# Patient Record
Sex: MALE | Race: WHITE
[De-identification: names, ages, dates, MRNs, and addresses within clinical notes are randomized per-mention and may not be internally consistent; named-entity substitution may affect disease eponyms.]

---

## 2018-09-13 ENCOUNTER — HOSPITAL ENCOUNTER (EMERGENCY)
Dept: HOSPITAL 62 - ER | Age: 43
Discharge: HOME | End: 2018-09-13
Payer: COMMERCIAL

## 2018-09-13 DIAGNOSIS — L23.7: Primary | ICD-10-CM

## 2018-09-13 PROCEDURE — 96372 THER/PROPH/DIAG INJ SC/IM: CPT

## 2018-09-13 PROCEDURE — 99282 EMERGENCY DEPT VISIT SF MDM: CPT

## 2018-09-13 NOTE — ER DOCUMENT REPORT
ED Skin Rash/Insect Bite/Abscs





- General


Chief Complaint: Skin Problem


Stated Complaint: POSSIBLE ALLERGIC REACTION


Time Seen by Provider: 09/13/18 07:57


Notes: 





42-year-old male to the emergency department chief complaint of rash and 

itching.  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  Knows that he was exposed 

to it because he was cutting a downed tree.  Has rash on his left arm, neck, 

face, back.  Very uncomfortable. Cant Stop itching.


TRAVEL OUTSIDE OF THE [place] IN LAST 30 DAYS: No





- HPI


Patient complains to provider of: Skin rash/lesion


Onset: Yesterday


Onset/Duration: Gradual, Worse





- Related Data


Allergies/Adverse Reactions: 


 





No Known Allergies Allergy (Unverified 09/13/18 07:50)


 











Past Medical History





- General


Information source: Patient





- Social History


Smoking Status: Never Smoker


Frequency of alcohol use: Occasional


Drug Abuse: None


Lives with: Family


Family History: Reviewed & Not Pertinent





- Medical History


Medical History: Negative





Review of Systems





- Review of Systems


Constitutional: denies: Fever, Malaise, Weakness


EENT: denies: Double vision, Difficulty swallowing, Throat swelling, Mouth pain


Cardiovascular: denies: Chest pain, Palpitations, Heart racing


Respiratory: denies: Cough, Hurts to breathe, Short of breath, Wheezing


Musculoskeletal: denies: Back pain, Joint pain


Skin: See HPI, Lesions, Rash





Physical Exam





- Vital signs


Interpretation: Normal





- General


General appearance: Appears well, Alert





- HEENT


Head: Normocephalic, Atraumatic


Eyes: Normal


Pupils: PERRL





- Respiratory


Respiratory status: No respiratory distress


Chest status: Nontender


Breath sounds: Normal


Chest palpation: Normal





- Cardiovascular


Rhythm: Regular


Heart sounds: Normal auscultation


Murmur: No





- Abdominal


Inspection: Normal


Distension: No distension


Bowel sounds: Normal


Tenderness: Nontender


Organomegaly: No organomegaly





- Extremities


General upper extremity: Nontender, Normal color, Normal ROM, Normal temperature

, Other - Left upper extremity demonstrates rash consistent with a contact 

dermatitis


General lower extremity: Normal inspection, Nontender, Normal color, Normal ROM

, Normal temperature, Normal weight bearing.  No: Ousmane's sign





- Skin


Skin Temperature: Warm


Skin Moisture: Dry


Skin Color: Other - There is a large amount of distribution of what appears to 

be a contact dermatitis on the left upper extremity with vesicles and blisters 

with surrounding erythema.  Rash extends up to the left arm, axilla, left side 

of the neck, face, back.





Course





- Re-evaluation


Re-evalutation: 





09/13/18 08:26


We will give a shot of Decadron here, Pepcid, DC with a Solu-Medrol Dosepak and 

advised to return if symptoms get worse.





Discharge





- Discharge


Clinical Impression: 


 Poison oak dermatitis





Condition: Good


Disposition: HOME, SELF-CARE


Instructions:  Contact Dermatitis ([facility])


Prescriptions: 


Methylprednisolone [Medrol Dosepack (4 mg/Tab) 21 Tab/Dosepak] 21 tab PO DAILY 

6 Days #1 dspk


Methylprednisolone [Medrol Dosepack (4 mg/Tab) 21 Tab/Dosepak] 21 tab PO DAILY 

6 Days #1 dspk


Referrals: 


YENI LESLIE MD [Primary Care Provider] - Follow up as needed